# Patient Record
Sex: FEMALE | Race: WHITE | URBAN - METROPOLITAN AREA
[De-identification: names, ages, dates, MRNs, and addresses within clinical notes are randomized per-mention and may not be internally consistent; named-entity substitution may affect disease eponyms.]

---

## 2024-08-17 ENCOUNTER — OFFICE VISIT (OUTPATIENT)
Age: 74
End: 2024-08-17

## 2024-08-17 VITALS
WEIGHT: 177 LBS | HEART RATE: 99 BPM | BODY MASS INDEX: 33.42 KG/M2 | SYSTOLIC BLOOD PRESSURE: 124 MMHG | HEIGHT: 61 IN | RESPIRATION RATE: 16 BRPM | DIASTOLIC BLOOD PRESSURE: 68 MMHG | TEMPERATURE: 98 F | OXYGEN SATURATION: 98 %

## 2024-08-17 DIAGNOSIS — J01.40 ACUTE NON-RECURRENT PANSINUSITIS: Primary | ICD-10-CM

## 2024-08-17 RX ORDER — FLUTICASONE PROPIONATE 50 MCG
1 SPRAY, SUSPENSION (ML) NASAL DAILY
COMMUNITY
Start: 2014-09-27

## 2024-08-17 RX ORDER — ROSUVASTATIN CALCIUM 5 MG/1
TABLET, COATED ORAL
COMMUNITY

## 2024-08-17 RX ORDER — BROMPHENIRAMINE MALEATE, PSEUDOEPHEDRINE HYDROCHLORIDE, AND DEXTROMETHORPHAN HYDROBROMIDE 2; 30; 10 MG/5ML; MG/5ML; MG/5ML
5 SYRUP ORAL 4 TIMES DAILY PRN
Qty: 118 ML | Refills: 0 | Status: SHIPPED | OUTPATIENT
Start: 2024-08-17

## 2024-08-17 RX ORDER — CELECOXIB 200 MG/1
CAPSULE ORAL
COMMUNITY
Start: 2021-02-10

## 2024-08-17 RX ORDER — FUROSEMIDE 20 MG/1
20 TABLET ORAL DAILY
COMMUNITY
Start: 2024-05-24

## 2024-08-17 RX ORDER — CEFDINIR 300 MG/1
300 CAPSULE ORAL 2 TIMES DAILY
Qty: 20 CAPSULE | Refills: 0 | Status: SHIPPED | OUTPATIENT
Start: 2024-08-17 | End: 2024-08-27

## 2024-08-17 RX ORDER — DAPAGLIFLOZIN 5 MG/1
5 TABLET, FILM COATED ORAL DAILY
COMMUNITY
Start: 2024-06-07

## 2024-08-17 RX ORDER — SERTRALINE HYDROCHLORIDE 100 MG/1
TABLET, FILM COATED ORAL
COMMUNITY
Start: 2013-08-19

## 2024-08-17 RX ORDER — ZOLPIDEM TARTRATE 5 MG/1
1 TABLET ORAL NIGHTLY PRN
COMMUNITY
Start: 2013-08-19

## 2024-08-17 ASSESSMENT — ENCOUNTER SYMPTOMS: SINUS PRESSURE: 1

## 2024-08-17 NOTE — PROGRESS NOTES
Marlen Bustamante (:  1950) is a 73 y.o. female,New patient, here for evaluation of the following chief complaint(s):  Sinusitis (Sinus congestion- drainage  / bilateral ear discomfort x 2/3 days )      Assessment & Plan :  Visit Diagnoses and Associated Orders       Acute non-recurrent pansinusitis    -  Primary    cefdinir (OMNICEF) 300 MG capsule [25516]      brompheniramine-pseudoephedrine-DM 2-30-10 MG/5ML syrup [94994]           ORDERS WITHOUT AN ASSOCIATED DIAGNOSIS    zolpidem (AMBIEN) 5 MG tablet [76338]      sertraline (ZOLOFT) 100 MG tablet [17868]      rosuvastatin (CRESTOR) 5 MG tablet [86641]      metoprolol tartrate (LOPRESSOR) 25 MG tablet [84768]      furosemide (LASIX) 20 MG tablet [3294]      fluticasone (FLONASE) 50 MCG/ACT nasal spray [82987]      FARXIGA 5 MG tablet [440363]      celecoxib (CELEBREX) 200 MG capsule [98369]      Coenzyme Q10 10 MG CAPS [18324]             Increase fluids (preferably with electrolytes) and rest.  Emergency follow up required for symptoms including, but not limited to, shortness of breath, chest pain, mental status change, fevers >101, difficulty or inability to swallow, dehydration, or if symptoms worsen.  See printed instructions given at discharge.     Subjective :  C/o yellow productive cough, chest congestion, sinus pressure (frontal/maxillary), bilateral ear pain, yellow nasal congestion, headaches    Bilateral TM are bulging. Congested cough noted. Throat is mildly red and swollen.       History provided by:  Patient  Sinusitis  This is a recurrent problem. The current episode started in the past 7 days. The problem has been gradually worsening since onset. There has been no fever. The pain is moderate. Associated symptoms include congestion, ear pain, headaches and sinus pressure.     HPI:   73 y.o. female presents with symptoms of bacterial sinusitis         Vitals:    24 1216 24 1228   BP: (!) 159/89 124/68   Site: Right Upper

## 2024-08-17 NOTE — PATIENT INSTRUCTIONS
Discharge medications reviewed with the patient and appropriate educational materials and side effects teaching were provided.    Increase fluids (preferably with electrolytes) and rest.  Emergency follow up required for symptoms including, but not limited to, shortness of breath, chest pain, mental status change, fevers >101, difficulty or inability to swallow, dehydration, or if symptoms worsen.  See printed instructions given at discharge.